# Patient Record
Sex: FEMALE | Race: WHITE | Employment: FULL TIME | URBAN - METROPOLITAN AREA
[De-identification: names, ages, dates, MRNs, and addresses within clinical notes are randomized per-mention and may not be internally consistent; named-entity substitution may affect disease eponyms.]

---

## 2017-02-23 ENCOUNTER — TELEPHONE (OUTPATIENT)
Dept: FAMILY MEDICINE CLINIC | Age: 57
End: 2017-02-23

## 2017-02-23 NOTE — TELEPHONE ENCOUNTER
Patient called and notified that she will need apt. Stated that she will talk to work and that she will call tomorrow to schedule apt. Patient advised to go to urgent care if condition worsens. Patient gave verbal understanding. Dr. Nayla De La Torre was notified of patient. Closing encounter.

## 2017-02-23 NOTE — TELEPHONE ENCOUNTER
PT CALLED STATING THAT SHE HAS BEEN EXPERIENCING DIZZY SPELLS LATELY. STATED IT HAPPENS WHEN SHE'S IN BED, WHEN SHE GETTING UP, OR EVEN JUST SITTING AT HER DESK. OFFERED PT TO COME IN FOR A VISIT BT SHE DECLINED DUE TO HER HAVING A NEW JOB AND NOT KNOWING WHAT HER HER WORK SCHEDULE IS.

## 2017-05-03 ENCOUNTER — OFFICE VISIT (OUTPATIENT)
Dept: FAMILY MEDICINE CLINIC | Age: 57
End: 2017-05-03

## 2017-05-03 ENCOUNTER — HOSPITAL ENCOUNTER (OUTPATIENT)
Dept: LAB | Age: 57
Discharge: HOME | End: 2017-05-03
Payer: COMMERCIAL

## 2017-05-03 VITALS
WEIGHT: 193 LBS | HEART RATE: 69 BPM | SYSTOLIC BLOOD PRESSURE: 147 MMHG | HEIGHT: 65 IN | RESPIRATION RATE: 18 BRPM | OXYGEN SATURATION: 97 % | DIASTOLIC BLOOD PRESSURE: 75 MMHG | TEMPERATURE: 97.7 F | BODY MASS INDEX: 32.15 KG/M2

## 2017-05-03 DIAGNOSIS — M25.559 CHRONIC HIP PAIN, UNSPECIFIED LATERALITY: ICD-10-CM

## 2017-05-03 DIAGNOSIS — I10 ESSENTIAL HYPERTENSION: ICD-10-CM

## 2017-05-03 DIAGNOSIS — G89.29 CHRONIC HIP PAIN, UNSPECIFIED LATERALITY: ICD-10-CM

## 2017-05-03 DIAGNOSIS — F32.89 OTHER DEPRESSION: ICD-10-CM

## 2017-05-03 DIAGNOSIS — I10 ESSENTIAL HYPERTENSION: Primary | ICD-10-CM

## 2017-05-03 LAB
ANION GAP BLD CALC-SCNC: 10 MMOL/L (ref 3–18)
BUN SERPL-MCNC: 20 MG/DL (ref 7–18)
BUN/CREAT SERPL: 23 (ref 12–20)
CALCIUM SERPL-MCNC: 9.1 MG/DL (ref 8.5–10.1)
CHLORIDE SERPL-SCNC: 104 MMOL/L (ref 100–108)
CO2 SERPL-SCNC: 24 MMOL/L (ref 21–32)
CREAT SERPL-MCNC: 0.88 MG/DL (ref 0.6–1.3)
GLUCOSE SERPL-MCNC: 90 MG/DL (ref 74–99)
POTASSIUM SERPL-SCNC: 3.7 MMOL/L (ref 3.5–5.5)
SODIUM SERPL-SCNC: 138 MMOL/L (ref 136–145)

## 2017-05-03 PROCEDURE — 36415 COLL VENOUS BLD VENIPUNCTURE: CPT | Performed by: FAMILY MEDICINE

## 2017-05-03 PROCEDURE — 80048 BASIC METABOLIC PNL TOTAL CA: CPT | Performed by: FAMILY MEDICINE

## 2017-05-03 RX ORDER — LISINOPRIL AND HYDROCHLOROTHIAZIDE 12.5; 2 MG/1; MG/1
1 TABLET ORAL DAILY
Qty: 90 TAB | Refills: 1 | Status: SHIPPED | OUTPATIENT
Start: 2017-05-03 | End: 2017-11-15 | Stop reason: SDUPTHER

## 2017-05-03 RX ORDER — TRAMADOL HYDROCHLORIDE 50 MG/1
50 TABLET ORAL
COMMUNITY
End: 2017-05-03 | Stop reason: SDUPTHER

## 2017-05-03 RX ORDER — CITALOPRAM 20 MG/1
20 TABLET, FILM COATED ORAL DAILY
Qty: 90 TAB | Refills: 1 | Status: SHIPPED | OUTPATIENT
Start: 2017-05-03 | End: 2018-01-04 | Stop reason: SDUPTHER

## 2017-05-03 RX ORDER — TRAMADOL HYDROCHLORIDE 50 MG/1
50 TABLET ORAL
Qty: 90 TAB | Refills: 0 | Status: SHIPPED | OUTPATIENT
Start: 2017-05-03 | End: 2019-02-26

## 2017-05-03 NOTE — PROGRESS NOTES
Maritza Eisenberg is a 61 y.o.  female and presents with F/U depression  And HTN     Chief Complaint   Patient presents with    Medication Refill     Subjective: Additional Concerns: none    Patient Active Problem List    Diagnosis Date Noted    Hip osteoarthritis 11/28/2016    Depression 11/15/2016    Conjunctivitis 11/15/2016    HTN (hypertension) 11/15/2016    Degenerative joint disease (DJD) of hip 11/15/2016    Status post right hip replacement 03/28/2016     Current Outpatient Prescriptions   Medication Sig Dispense Refill    lisinopril-hydroCHLOROthiazide (PRINZIDE, ZESTORETIC) 20-12.5 mg per tablet Take 1 Tab by mouth daily. 90 Tab 1    traMADol (ULTRAM) 50 mg tablet Take 1 Tab by mouth every six (6) hours as needed for Pain. Max Daily Amount: 200 mg. 90 Tab 0    citalopram (CELEXA) 20 mg tablet Take 1 Tab by mouth daily. Indications: ANXIETY WITH DEPRESSION 90 Tab 1    TURMERIC ROOT EXTRACT PO Take  by mouth.  Omeprazole delayed release (PRILOSEC D/R) 20 mg tablet Take 20 mg by mouth daily.  oxyCODONE-acetaminophen (PERCOCET) 5-325 mg per tablet Take 1-2 Tabs by mouth every four (4) hours as needed. Max Daily Amount: 12 Tabs. 60 Tab 0    aspirin (ASPIRIN) 325 mg tablet Take 1 Tab by mouth two (2) times a day. 30 Tab 0    genistein (I-COOL) 30 mg tab Take  by mouth daily. Allergies   Allergen Reactions    Codeine Itching     Past Medical History:   Diagnosis Date    Anxiety     Arthritis     Depression     GERD (gastroesophageal reflux disease)     Hypertension 1995    Melanoma ([address]     wrist    Postmenopausal      Past Surgical History:   Procedure Laterality Date    HX GYN  2003    uterine ablation    HX ORTHOPAEDIC Right     hip replacement    HX SKIN BIOPSY      HX TONSILLECTOMY       No family history on file.   Social History   Substance Use Topics    Smoking status: Former Smoker    Smokeless tobacco: Current User    Alcohol use 2.4 - 3.0 oz/week 4 - 5 Glasses of wine per week     ROS     General: negative for - chills, fatigue, fever, weight change  Psych: positive for - anxiety, depression, irritability or mood swings  Resp: negative for - cough, shortness of breath or wheezing  CV: negative for - chest pain, edema or palpitations  MSK: negative for - joint pain, joint swelling or muscle pain  Neuro: negative for - confusion, headaches, seizures or weakness    Objective:  Vitals:    05/03/17 1611   BP: 147/75   Pulse: 69   Resp: 18   Temp: 97.7 °F (36.5 °C)   TempSrc: Oral   SpO2: 97%   Weight: 193 lb (87.5 kg)   Height: 5' 5\" (1.651 m)   PainSc:   0 - No pain     PE    Alert, well appearing, and in no distress, oriented to person, place, and time and normal appearing weight  Mental status - alert, oriented to person, place, and time, normal mood, behavior, speech, dress, motor activity, and thought processes  Chest - clear to auscultation, no wheezes, rales or rhonchi, symmetric air entry  Heart - normal rate, regular rhythm, normal S1, S2, no murmurs, rubs, clicks or gallops  Extremities - peripheral pulses normal, no pedal edema, no clubbing or cyanosis    LABS   Admission on 11/28/2016, Discharged on 11/29/2016   Component Date Value Ref Range Status    Crossmatch Expiration 11/28/2016 12/01/2016   Final    ABO/Rh(D) 11/28/2016 A POSITIVE   Final    Antibody screen 11/28/2016 NEG   Final    Sodium 11/29/2016 139  136 - 145 mmol/L Final    Potassium 11/29/2016 3.6  3.5 - 5.5 mmol/L Final    Chloride 11/29/2016 104  100 - 108 mmol/L Final    CO2 11/29/2016 29  21 - 32 mmol/L Final    Anion gap 11/29/2016 6  3.0 - 18 mmol/L Final    Glucose 11/29/2016 144* 74 - 99 mg/dL Final    BUN 11/29/2016 12  7.0 - 18 MG/DL Final    Creatinine 11/29/2016 0.76  0.6 - 1.3 MG/DL Final    BUN/Creatinine ratio 11/29/2016 16  12 - 20   Final    GFR est AA 11/29/2016 >60  >60 ml/min/1.73m2 Final    GFR est non-AA 11/29/2016 >60  >60 ml/min/1.73m2 Final Comment: (NOTE)  Estimated GFR is calculated using the Modification of Diet in Renal   Disease (MDRD) Study equation, reported for both  Americans   (GFRAA) and non- Americans (GFRNA), and normalized to 1.73m2   body surface area. The physician must decide which value applies to   the patient. The MDRD study equation should only be used in   individuals age 25 or older. It has not been validated for the   following: pregnant women, patients with serious comorbid conditions,   or on certain medications, or persons with extremes of body size,   muscle mass, or nutritional status.       Calcium 11/29/2016 7.9* 8.5 - 10.1 MG/DL Final    WBC 11/29/2016 9.3  4.6 - 13.2 K/uL Final    RBC 11/29/2016 3.16* 4.20 - 5.30 M/uL Final    HGB 11/29/2016 9.7* 12.0 - 16.0 g/dL Final    HCT 11/29/2016 28.7* 35.0 - 45.0 % Final    MCV 11/29/2016 90.8  74.0 - 97.0 FL Final    MCH 11/29/2016 30.7  24.0 - 34.0 PG Final    MCHC 11/29/2016 33.8  31.0 - 37.0 g/dL Final    RDW 11/29/2016 13.1  11.6 - 14.5 % Final    PLATELET 61/73/5904 015  135 - 420 K/uL Final    MPV 11/29/2016 8.6* 9.2 - 11.8 FL Final    Prothrombin time 11/29/2016 12.6  11.5 - 15.2 sec Final    INR 11/29/2016 1.0  0.8 - 1.2   Final    Comment:            INR Therapeutic Ranges         (on stable oral anticoagulant):     INDICATION                INR  DVT/PE/Atrial Fib          2.0-3.0  MI/Mechanical Heart Valve  2.5-3.5     Hospital Outpatient Visit on 11/10/2016   Component Date Value Ref Range Status    Prothrombin time 11/10/2016 12.2  11.5 - 15.2 sec Final    INR 11/10/2016 0.9  0.8 - 1.2   Final    Comment:            INR Therapeutic Ranges         (on stable oral anticoagulant):     INDICATION                INR  DVT/PE/Atrial Fib          2.0-3.0  MI/Mechanical Heart Valve  2.5-3.5      aPTT 11/10/2016 25.7  23.0 - 36.4 SEC Final    WBC 11/10/2016 8.2  4.6 - 13.2 K/uL Final    RBC 11/10/2016 4.62  4.20 - 5.30 M/uL Final    HGB 11/10/2016 14.1  12.0 - 16.0 g/dL Final    HCT 11/10/2016 42.3  35.0 - 45.0 % Final    MCV 11/10/2016 91.6  74.0 - 97.0 FL Final    MCH 11/10/2016 30.5  24.0 - 34.0 PG Final    MCHC 11/10/2016 33.3  31.0 - 37.0 g/dL Final    RDW 11/10/2016 12.7  11.6 - 14.5 % Final    PLATELET 76/11/7121 910  135 - 420 K/uL Final    MPV 11/10/2016 8.9* 9.2 - 11.8 FL Final    NEUTROPHILS 11/10/2016 69  40 - 73 % Final    LYMPHOCYTES 11/10/2016 25  21 - 52 % Final    MONOCYTES 11/10/2016 5  3 - 10 % Final    EOSINOPHILS 11/10/2016 1  0 - 5 % Final    BASOPHILS 11/10/2016 0  0 - 2 % Final    ABS. NEUTROPHILS 11/10/2016 5.7  1.8 - 8.0 K/UL Final    ABS. LYMPHOCYTES 11/10/2016 2.0  0.9 - 3.6 K/UL Final    ABS. MONOCYTES 11/10/2016 0.4  0.05 - 1.2 K/UL Final    ABS. EOSINOPHILS 11/10/2016 0.1  0.0 - 0.4 K/UL Final    ABS. BASOPHILS 11/10/2016 0.0  0.0 - 0.06 K/UL Final    DF 11/10/2016 AUTOMATED    Final    Sed rate, automated 11/10/2016 13  0 - 30 mm/hr Final    Sodium 11/10/2016 138  136 - 145 mmol/L Final    Potassium 11/10/2016 3.9  3.5 - 5.5 mmol/L Final    Chloride 11/10/2016 102  100 - 108 mmol/L Final    CO2 11/10/2016 29  21 - 32 mmol/L Final    Anion gap 11/10/2016 7  3.0 - 18 mmol/L Final    Glucose 11/10/2016 86  74 - 99 mg/dL Final    BUN 11/10/2016 15  7.0 - 18 MG/DL Final    Creatinine 11/10/2016 0.72  0.6 - 1.3 MG/DL Final    BUN/Creatinine ratio 11/10/2016 21* 12 - 20   Final    GFR est AA 11/10/2016 >60  >60 ml/min/1.73m2 Final    GFR est non-AA 11/10/2016 >60  >60 ml/min/1.73m2 Final    Comment: (NOTE)  Estimated GFR is calculated using the Modification of Diet in Renal   Disease (MDRD) Study equation, reported for both  Americans   (GFRAA) and non- Americans (GFRNA), and normalized to 1.73m2   body surface area. The physician must decide which value applies to   the patient. The MDRD study equation should only be used in   individuals age 25 or older.  It has not been validated for the   following: pregnant women, patients with serious comorbid conditions,   or on certain medications, or persons with extremes of body size,   muscle mass, or nutritional status.  Calcium 11/10/2016 9.2  8.5 - 10.1 MG/DL Final    Bilirubin, total 11/10/2016 0.5  0.2 - 1.0 MG/DL Final    ALT (SGPT) 11/10/2016 21  13 - 56 U/L Final    AST (SGOT) 11/10/2016 16  15 - 37 U/L Final    Alk. phosphatase 11/10/2016 132* 45 - 117 U/L Final    Protein, total 11/10/2016 6.8  6.4 - 8.2 g/dL Final    Albumin 11/10/2016 3.9  3.4 - 5.0 g/dL Final    Globulin 11/10/2016 2.9  2.0 - 4.0 g/dL Final    A-G Ratio 11/10/2016 1.3  0.8 - 1.7   Final    Color 11/10/2016 YELLOW    Final    Appearance 11/10/2016 CLEAR    Final    Specific gravity 11/10/2016 1.020  1.003 - 1.030   Final    pH (UA) 11/10/2016 7.0  5.0 - 8.0   Final    Protein 11/10/2016 NEGATIVE   NEG mg/dL Final    Glucose 11/10/2016 NEGATIVE   NEG mg/dL Final    Ketone 11/10/2016 NEGATIVE   NEG mg/dL Final    Bilirubin 11/10/2016 NEGATIVE   NEG   Final    Blood 11/10/2016 NEGATIVE   NEG   Final    Urobilinogen 11/10/2016 0.2  0.2 - 1.0 EU/dL Final    Nitrites 11/10/2016 NEGATIVE   NEG   Final    Leukocyte Esterase 11/10/2016 NEGATIVE   NEG   Final    Special Requests: 11/10/2016 NO SPECIAL REQUESTS    Final    Culture result: 11/10/2016 MRSA target DNA is not detected (presumptive not colonized with MRSA)    Final    Hemoglobin A1c 11/10/2016 5.1  4.2 - 5.6 % Final    Comment: (NOTE)  HbA1C Interpretive Ranges  <5.7              Normal  5.7 - 6.4         Consider Prediabetes  >6.5              Consider Diabetes      Est. average glucose 11/10/2016 100  mg/dL Final    Comment: (NOTE)  The eAG should be interpreted with patient characteristics in mind   since ethnicity, interindividual differences, red cell lifespan,   variation in rates of glycation, etc. may affect the validity of the   calculation.      Hospital Outpatient Visit on 11/10/2016   Component Date Value Ref Range Status    Ventricular Rate 11/10/2016 67  BPM Final    Atrial Rate 11/10/2016 67  BPM Final    P-R Interval 11/10/2016 112  ms Final    QRS Duration 11/10/2016 90  ms Final    Q-T Interval 11/10/2016 416  ms Final    QTC Calculation (Bezet) 11/10/2016 439  ms Final    Calculated P Axis 11/10/2016 1  degrees Final    Calculated R Axis 11/10/2016 22  degrees Final    Calculated T Axis 11/10/2016 46  degrees Final    Diagnosis 11/10/2016    Final                    Value:Normal sinus rhythm  Confirmed by John Hernandez ([id number]) on 11/10/2016 6:30:30 PM         TESTS  Results for orders placed or performed during the hospital encounter of 78/22/69   METABOLIC PANEL, BASIC   Result Value Ref Range    Sodium 139 136 - 145 mmol/L    Potassium 3.6 3.5 - 5.5 mmol/L    Chloride 104 100 - 108 mmol/L    CO2 29 21 - 32 mmol/L    Anion gap 6 3.0 - 18 mmol/L    Glucose 144 (H) 74 - 99 mg/dL    BUN 12 7.0 - 18 MG/DL    Creatinine 0.76 0.6 - 1.3 MG/DL    BUN/Creatinine ratio 16 12 - 20      GFR est AA >60 >60 ml/min/1.73m2    GFR est non-AA >60 >60 ml/min/1.73m2    Calcium 7.9 (L) 8.5 - 10.1 MG/DL   CBC W/O DIFF   Result Value Ref Range    WBC 9.3 4.6 - 13.2 K/uL    RBC 3.16 (L) 4.20 - 5.30 M/uL    HGB 9.7 (L) 12.0 - 16.0 g/dL    HCT 28.7 (L) 35.0 - 45.0 %    MCV 90.8 74.0 - 97.0 FL    MCH 30.7 24.0 - 34.0 PG    MCHC 33.8 31.0 - 37.0 g/dL    RDW 13.1 11.6 - 14.5 %    PLATELET 074 178 - 431 K/uL    MPV 8.6 (L) 9.2 - 11.8 FL   PROTHROMBIN TIME + INR   Result Value Ref Range    Prothrombin time 12.6 11.5 - 15.2 sec    INR 1.0 0.8 - 1.2     TYPE & SCREEN   Result Value Ref Range    Crossmatch Expiration 12/01/2016     ABO/Rh(D) A POSITIVE     Antibody screen NEG      Assessment/Plan:     1. HTN uncontrolled - BMP ordered for monitoring. Refilled BP med and asked patient to take regularly and to monitor BP daily  Over 2-4 weeks. 2. Depression stable - Refilled Celexa x 6 months. There are no diagnoses linked to this encounter. Lab review: orders written for new lab studies as appropriate; see orders. I have discussed the diagnosis with the patient and the intended plan as seen in the above orders. The patient has received an after-visit summary and questions were answered concerning future plans. I have discussed medication side effects and warnings with the patient as well. I have reviewed the plan of care with the patient, accepted their input and they are in agreement with the treatment goals. F/U as needed. F/U in 2-4 weeks if BP above 140/90.      Chase De Leon MD

## 2017-05-03 NOTE — PROGRESS NOTES
Consuelo Gerard is a 61 y.o. female presents to office for medication refill. 1. Have you been to the ER, urgent care clinic or hospitalized since your last visit? yes  2. Have you seen any other providers outside of [facility] since your last visit? yes  3.  Have you had a Flu shot this year? no      Health Maintenance items with a due date reviewed with patient:  Health Maintenance Due   Topic Date Due    Hepatitis C Screening  1960    DTaP/Tdap/Td series (1 - Tdap) 03/08/1980    PAP AKA CERVICAL CYTOLOGY  03/08/1980    FOBT Q 1 YEAR AGE 50-75  03/08/2009

## 2017-05-03 NOTE — MR AVS SNAPSHOT
Visit Information Date & Time Provider Department Dept. Phone Encounter #  
 5/3/2017  4:15 PM Ramón Gonzalez MD [place] [phone] [id number] Upcoming Health Maintenance Date Due Hepatitis C Screening 1960 DTaP/Tdap/Td series (1 - Tdap) 3/8/1980 PAP AKA CERVICAL CYTOLOGY 3/8/1980 FOBT Q 1 YEAR AGE 50-75 3/8/2009 INFLUENZA AGE 9 TO ADULT 8/1/2017 BREAST CANCER SCRN MAMMOGRAM 10/13/2018 Allergies as of 5/3/2017  Review Complete On: 5/3/2017 By: Deloris Mercedes LPN Severity Noted Reaction Type Reactions Codeine  03/25/2016    Itching Current Immunizations  Never Reviewed No immunizations on file. Not reviewed this visit Vitals BP Pulse Temp Resp Height(growth percentile) Weight(growth percentile) 147/75 (BP 1 Location: Right arm, BP Patient Position: Sitting) 69 97.7 °F (36.5 °C) (Oral) 18 5' 5\" (1.651 m) 193 lb (87.5 kg) SpO2 BMI OB Status Smoking Status 97% 32.12 kg/m2 Postmenopausal Former Smoker BMI and BSA Data Body Mass Index Body Surface Area  
 32.12 kg/m 2 2 m 2 Preferred Pharmacy Pharmacy Name Phone [facility], [address] [address] [place] [address] [phone] Your Updated Medication List  
  
   
This list is accurate as of: 5/3/17  4:38 PM.  Always use your most recent med list.  
  
  
  
  
 aspirin 325 mg tablet Commonly known as:  ASPIRIN Take 1 Tab by mouth two (2) times a day. citalopram 20 mg tablet Commonly known as:  Isaak Heckle Take 1 Tab by mouth daily. Indications: ANXIETY WITH DEPRESSION  
  
 I-COOL 30 mg Tab Generic drug:  genistein Take  by mouth daily. lisinopril-hydroCHLOROthiazide 20-12.5 mg per tablet Commonly known as:  Esthela Collinthergill Take 1 Tab by mouth daily. Omeprazole delayed release 20 mg tablet Commonly known as:  PRILOSEC D/R Take 20 mg by mouth daily. oxyCODONE-acetaminophen 5-325 mg per tablet Commonly known as:  PERCOCET Take 1-2 Tabs by mouth every four (4) hours as needed. Max Daily Amount: 12 Tabs. traMADol 50 mg tablet Commonly known as:  ULTRAM  
Take 1 Tab by mouth every six (6) hours as needed for Pain. Max Daily Amount: 200 mg.  
  
 TURMERIC ROOT EXTRACT PO Take  by mouth. Prescriptions Printed Refills  
 traMADol (ULTRAM) 50 mg tablet 0 Sig: Take 1 Tab by mouth every six (6) hours as needed for Pain. Max Daily Amount: 200 mg. Class: Print Route: Oral  
  
Prescriptions Sent to Pharmacy Refills  
 lisinopril-hydroCHLOROthiazide (PRINZIDE, ZESTORETIC) 20-12.5 mg per tablet 1 Sig: Take 1 Tab by mouth daily. Class: Normal  
 Pharmacy: [facility] [address] Ph #: [phone] Route: Oral  
 citalopram (CELEXA) 20 mg tablet 1 Sig: Take 1 Tab by mouth daily. Indications: ANXIETY WITH DEPRESSION Class: Normal  
 Pharmacy: [facility] [address] Ph #: [phone] Route: Oral  
  
Introducing [facility]! Heath Alphonso introduces [employer] patient portal. Now you can access parts of your medical record, email your doctor's office, and request medication refills online. 1. In your internet browser, go to [email or web address] 2. Click on the First Time User? Click Here link in the Sign In box. You will see the New Member Sign Up page. 3. Enter your [employer] Access Code exactly as it appears below. You will not need to use this code after youve completed the sign-up process. If you do not sign up before the expiration date, you must request a new code. · [employer] Access Code: [email or web address] Expires: 8/1/2017  4:38 PM 
 
4. Enter the last four digits of your Social Security Number (xxxx) and Date of Birth (mm/dd/yyyy) as indicated and click Submit. You will be taken to the next sign-up page. 5. Create a [employer] ID. This will be your [employer] login ID and cannot be changed, so think of one that is secure and easy to remember. 6. Create a [employer] password. You can change your password at any time. 7. Enter your Password Reset Question and Answer. This can be used at a later time if you forget your password. 8. Enter your e-mail address. You will receive e-mail notification when new information is available in [address] 9. Click Sign Up. You can now view and download portions of your medical record. 10. Click the Download Summary menu link to download a portable copy of your medical information. If you have questions, please visit the Frequently Asked Questions section of the [employer] website. Remember, [employer] is NOT to be used for urgent needs. For medical emergencies, dial 911. Now available from your iPhone and Android! Please provide this summary of care documentation to your next provider. Your primary care clinician is listed as Rocky Fabian. If you have any questions after today's visit, please call [phone].

## 2017-05-04 ENCOUNTER — TELEPHONE (OUTPATIENT)
Dept: FAMILY MEDICINE CLINIC | Age: 57
End: 2017-05-04

## 2017-05-04 NOTE — LETTER
5/5/2017 3:33 PM 
 
Ms. Sheila Milan [address] [phone] Dear Maritza Eisenberg I have reviewed your results and have found the results listed below to be within normal ranges. Basic Metabolic Panel My recommendations are as follows: Follow up as needed Please call if you have any questions [phone] . Sincerely, Jean Paul Ceballos MD

## 2017-05-06 NOTE — PATIENT INSTRUCTIONS
DASH Diet: Care Instructions  Your Care Instructions  The DASH diet is an eating plan that can help lower your blood pressure. DASH stands for Dietary Approaches to Stop Hypertension. Hypertension is high blood pressure. The DASH diet focuses on eating foods that are high in calcium, potassium, and magnesium. These nutrients can lower blood pressure. The foods that are highest in these nutrients are fruits, vegetables, low-fat dairy products, nuts, seeds, and legumes. But taking calcium, potassium, and magnesium supplements instead of eating foods that are high in those nutrients does not have the same effect. The DASH diet also includes whole grains, fish, and poultry. The DASH diet is one of several lifestyle changes your doctor may recommend to lower your high blood pressure. Your doctor may also want you to decrease the amount of sodium in your diet. Lowering sodium while following the DASH diet can lower blood pressure even further than just the DASH diet alone. Follow-up care is a key part of your treatment and safety. Be sure to make and go to all appointments, and call your doctor if you are having problems. It's also a good idea to know your test results and keep a list of the medicines you take. How can you care for yourself at home? Following the DASH diet  · Eat 4 to 5 servings of fruit each day. A serving is 1 medium-sized piece of fruit, ½ cup chopped or canned fruit, 1/4 cup dried fruit, or 4 ounces (½ cup) of fruit juice. Choose fruit more often than fruit juice. · Eat 4 to 5 servings of vegetables each day. A serving is 1 cup of lettuce or raw leafy vegetables, ½ cup of chopped or cooked vegetables, or 4 ounces (½ cup) of vegetable juice. Choose vegetables more often than vegetable juice. · Get 2 to 3 servings of low-fat and fat-free dairy each day. A serving is 8 ounces of milk, 1 cup of yogurt, or 1 ½ ounces of cheese. · Eat 6 to 8 servings of grains each day.  A serving is 1 slice of bread, 1 ounce of dry cereal, or ½ cup of cooked rice, pasta, or cooked cereal. Try to choose whole-grain products as much as possible. · Limit lean meat, poultry, and fish to 2 servings each day. A serving is 3 ounces, about the size of a deck of cards. · Eat 4 to 5 servings of nuts, seeds, and legumes (cooked dried beans, lentils, and split peas) each week. A serving is 1/3 cup of nuts, 2 tablespoons of seeds, or ½ cup of cooked beans or peas. · Limit fats and oils to 2 to 3 servings each day. A serving is 1 teaspoon of vegetable oil or 2 tablespoons of salad dressing. · Limit sweets and added sugars to 5 servings or less a week. A serving is 1 tablespoon jelly or jam, ½ cup sorbet, or 1 cup of lemonade. · Eat less than 2,300 milligrams (mg) of sodium a day. If you limit your sodium to 1,500 mg a day, you can lower your blood pressure even more. Tips for success  · Start small. Do not try to make dramatic changes to your diet all at once. You might feel that you are missing out on your favorite foods and then be more likely to not follow the plan. Make small changes, and stick with them. Once those changes become habit, add a few more changes. · Try some of the following:  ¨ Make it a goal to eat a fruit or vegetable at every meal and at snacks. This will make it easy to get the recommended amount of fruits and vegetables each day. ¨ Try yogurt topped with fruit and nuts for a snack or healthy dessert. ¨ Add lettuce, tomato, cucumber, and onion to sandwiches. ¨ Combine a ready-made pizza crust with low-fat mozzarella cheese and lots of vegetable toppings. Try using tomatoes, squash, spinach, broccoli, carrots, cauliflower, and onions. ¨ Have a variety of cut-up vegetables with a low-fat dip as an appetizer instead of chips and dip. ¨ Sprinkle sunflower seeds or chopped almonds over salads. Or try adding chopped walnuts or almonds to cooked vegetables. ¨ Try some vegetarian meals using beans and peas. Add garbanzo or kidney beans to salads. Make burritos and tacos with mashed pimentel beans or black beans. Where can you learn more? Go to http://peter-ana.info/. Enter J922 in the search box to learn more about \"DASH Diet: Care Instructions. \"  Current as of: March 23, 2016  Content Version: 11.2  © 1815-9137 [employer]. Care instructions adapted under license by [employer] (which disclaims liability or warranty for this information). If you have questions about a medical condition or this instruction, always ask your healthcare professional. [place] [address] any warranty or liability for your use of this information. Low Sodium Diet (2,000 Milligram): Care Instructions  Your Care Instructions  Too much sodium causes your body to hold on to extra water. This can raise your blood pressure and force your heart and kidneys to work harder. In very serious cases, this could cause you to be put in the hospital. It might even be life-threatening. By limiting sodium, you will feel better and lower your risk of serious problems. The most common source of sodium is salt. People get most of the salt in their diet from canned, prepared, and packaged foods. Fast food and restaurant meals also are very high in sodium. Your doctor will probably limit your sodium to less than 2,000 milligrams (mg) a day. This limit counts all the sodium in prepared and packaged foods and any salt you add to your food. Follow-up care is a key part of your treatment and safety. Be sure to make and go to all appointments, and call your doctor if you are having problems. It's also a good idea to know your test results and keep a list of the medicines you take. How can you care for yourself at home? Read food labels  · Read labels on cans and food packages. The labels tell you how much sodium is in each serving. Make sure that you look at the serving size.  If you eat more than the serving size, you have eaten more sodium. · Food labels also tell you the Percent Daily Value for sodium. Choose products with low Percent Daily Values for sodium. · Be aware that sodium can come in forms other than salt, including monosodium glutamate (MSG), sodium citrate, and sodium bicarbonate (baking soda). MSG is often added to Asian food. When you eat out, you can sometimes ask for food without MSG or added salt. Buy low-sodium foods  · Buy foods that are labeled \"unsalted\" (no salt added), \"sodium-free\" (less than 5 mg of sodium per serving), or \"low-sodium\" (less than 140 mg of sodium per serving). Foods labeled \"reduced-sodium\" and \"light sodium\" may still have too much sodium. Be sure to read the label to see how much sodium you are getting. · Buy fresh vegetables, or frozen vegetables without added sauces. Buy low-sodium versions of canned vegetables, soups, and other canned goods. Prepare low-sodium meals  · Cut back on the amount of salt you use in cooking. This will help you adjust to the taste. Do not add salt after cooking. One teaspoon of salt has about 2,300 mg of sodium. · Take the salt shaker off the table. · Flavor your food with garlic, lemon juice, onion, vinegar, herbs, and spices. Do not use soy sauce, lite soy sauce, steak sauce, onion salt, garlic salt, celery salt, mustard, or ketchup on your food. · Use low-sodium salad dressings, sauces, and ketchup. Or make your own salad dressings and sauces without adding salt. · Use less salt (or none) when recipes call for it. You can often use half the salt a recipe calls for without losing flavor. Other foods such as rice, pasta, and grains do not need added salt. · Rinse canned vegetables, and cook them in fresh water. This removes somebut not allof the salt. · Avoid water that is naturally high in sodium or that has been treated with water softeners, which add sodium.  Call your local water company to find out the sodium content of your water supply. If you buy bottled water, read the label and choose a sodium-free brand. Avoid high-sodium foods  · Avoid eating:  ¨ Smoked, cured, salted, and canned meat, fish, and poultry. ¨ Ham, puente, hot dogs, and luncheon meats. ¨ Regular, hard, and processed cheese and regular peanut butter. ¨ Crackers with salted tops, and other salted snack foods such as pretzels, chips, and salted popcorn. ¨ Frozen prepared meals, unless labeled low-sodium. ¨ Canned and dried soups, broths, and bouillon, unless labeled sodium-free or low-sodium. ¨ Canned vegetables, unless labeled sodium-free or low-sodium. ¨ [place] fries, pizza, tacos, and other fast foods. ¨ Pickles, olives, ketchup, and other condiments, especially soy sauce, unless labeled sodium-free or low-sodium. Where can you learn more? Go to http://peter-ana.info/. Enter D488 in the search box to learn more about \"Low Sodium Diet (2,000 Milligram): Care Instructions. \"  Current as of: July 26, 2016  Content Version: 11.2  © 7873-1873 [employer]. Care instructions adapted under license by [employer] (which disclaims liability or warranty for this information). If you have questions about a medical condition or this instruction, always ask your healthcare professional. [place] [address] any warranty or liability for your use of this information.

## 2017-07-19 ENCOUNTER — PATIENT OUTREACH (OUTPATIENT)
Dept: FAMILY MEDICINE CLINIC | Age: 57
End: 2017-07-19

## 2017-07-20 ENCOUNTER — PATIENT OUTREACH (OUTPATIENT)
Dept: FAMILY MEDICINE CLINIC | Age: 57
End: 2017-07-20

## 2017-11-15 DIAGNOSIS — I10 ESSENTIAL HYPERTENSION: ICD-10-CM

## 2017-11-15 RX ORDER — LISINOPRIL AND HYDROCHLOROTHIAZIDE 12.5; 2 MG/1; MG/1
TABLET ORAL
Qty: 90 TAB | Refills: 0 | Status: SHIPPED | OUTPATIENT
Start: 2017-11-15 | End: 2018-02-19 | Stop reason: SDUPTHER

## 2018-01-04 DIAGNOSIS — F32.89 OTHER DEPRESSION: ICD-10-CM

## 2018-01-09 RX ORDER — CITALOPRAM 20 MG/1
TABLET, FILM COATED ORAL
Qty: 90 TAB | Refills: 0 | Status: SHIPPED | OUTPATIENT
Start: 2018-01-09 | End: 2019-02-26

## 2018-02-19 DIAGNOSIS — I10 ESSENTIAL HYPERTENSION: ICD-10-CM

## 2018-02-19 NOTE — TELEPHONE ENCOUNTER
Pt calling to request medication refill of:    Requested Prescriptions     Pending Prescriptions Disp Refills    lisinopril-hydroCHLOROthiazide (PRINZIDE, ZESTORETIC) 20-12.5 mg per tablet 90 Tab 0     Sig: Take 1 Tab by mouth daily. be sent to [facility] in [place]. Pt has about 3 tabs remaining. Pts last appt was 05/03/17, next appt sched for 02/28/18. Advised pt of 72 hour time frame for refill requests. Please advise.

## 2018-02-19 NOTE — TELEPHONE ENCOUNTER
Dr. Janie Phan, please see refill request for patient, next apt is schedule for 02/28/18. thank you! Requested Prescriptions     Pending Prescriptions Disp Refills    lisinopril-hydroCHLOROthiazide (PRINZIDE, ZESTORETIC) 20-12.5 mg per tablet 90 Tab 0     Sig: Take 1 Tab by mouth daily.

## 2018-02-20 RX ORDER — LISINOPRIL AND HYDROCHLOROTHIAZIDE 12.5; 2 MG/1; MG/1
1 TABLET ORAL DAILY
Qty: 90 TAB | Refills: 0 | Status: SHIPPED | OUTPATIENT
Start: 2018-02-20 | End: 2018-05-04 | Stop reason: SDUPTHER

## 2018-05-04 DIAGNOSIS — I10 ESSENTIAL HYPERTENSION: ICD-10-CM

## 2018-05-22 RX ORDER — LISINOPRIL AND HYDROCHLOROTHIAZIDE 12.5; 2 MG/1; MG/1
TABLET ORAL
Qty: 30 TAB | Refills: 0 | Status: SHIPPED | OUTPATIENT
Start: 2018-05-22 | End: 2019-02-26

## 2018-12-18 ENCOUNTER — PATIENT OUTREACH (OUTPATIENT)
Dept: FAMILY MEDICINE CLINIC | Age: 58
End: 2018-12-18

## 2019-02-26 ENCOUNTER — OFFICE VISIT (OUTPATIENT)
Dept: FAMILY MEDICINE CLINIC | Age: 59
End: 2019-02-26

## 2019-02-26 VITALS
TEMPERATURE: 97.9 F | HEIGHT: 65 IN | SYSTOLIC BLOOD PRESSURE: 164 MMHG | BODY MASS INDEX: 31.49 KG/M2 | OXYGEN SATURATION: 97 % | HEART RATE: 76 BPM | DIASTOLIC BLOOD PRESSURE: 94 MMHG | RESPIRATION RATE: 18 BRPM | WEIGHT: 189 LBS

## 2019-02-26 DIAGNOSIS — E78.2 MIXED HYPERLIPIDEMIA: ICD-10-CM

## 2019-02-26 DIAGNOSIS — I10 ESSENTIAL HYPERTENSION: ICD-10-CM

## 2019-02-26 DIAGNOSIS — L82.1 SEBORRHEIC KERATOSIS: ICD-10-CM

## 2019-02-26 DIAGNOSIS — F33.2 SEVERE EPISODE OF RECURRENT MAJOR DEPRESSIVE DISORDER, WITHOUT PSYCHOTIC FEATURES (HCC): ICD-10-CM

## 2019-02-26 DIAGNOSIS — E66.09 CLASS 1 OBESITY DUE TO EXCESS CALORIES WITH BODY MASS INDEX (BMI) OF 31.0 TO 31.9 IN ADULT, UNSPECIFIED WHETHER SERIOUS COMORBIDITY PRESENT: Primary | ICD-10-CM

## 2019-02-26 RX ORDER — THERA TABS 400 MCG
1 TAB ORAL DAILY
COMMUNITY

## 2019-02-26 RX ORDER — NALTREXONE HYDROCHLORIDE 50 MG/1
50 TABLET, FILM COATED ORAL DAILY
COMMUNITY

## 2019-02-26 RX ORDER — LANOLIN ALCOHOL/MO/W.PET/CERES
CREAM (GRAM) TOPICAL DAILY
COMMUNITY

## 2019-02-26 RX ORDER — HYDROCHLOROTHIAZIDE 12.5 MG/1
12.5 TABLET ORAL DAILY
COMMUNITY

## 2019-02-26 RX ORDER — FOLIC ACID 1 MG/1
TABLET ORAL DAILY
COMMUNITY

## 2019-02-26 RX ORDER — LISINOPRIL 20 MG/1
TABLET ORAL DAILY
COMMUNITY

## 2019-02-26 RX ORDER — NICOTINE 7MG/24HR
1 PATCH, TRANSDERMAL 24 HOURS TRANSDERMAL EVERY 24 HOURS
COMMUNITY

## 2019-02-26 RX ORDER — FLUOXETINE HYDROCHLORIDE 40 MG/1
CAPSULE ORAL DAILY
COMMUNITY

## 2019-02-26 RX ORDER — OLANZAPINE 5 MG/1
TABLET ORAL
COMMUNITY
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
## 2019-02-26 NOTE — LETTER
NOTIFICATION RETURN TO WORK 
 
2/26/2019 12:02 PM 
 
Ms. Sheila [place] [address] [address] To Whom It May Concern: 
 
Solis Adams is currently under the care of [address] [place] [address]. She will return to work: in 60 days subjective to reassessment. If there are questions or concerns please have the patient contact our office. Sincerely, Roderick Boyle MD

## 2019-02-26 NOTE — PROGRESS NOTES
Crow Ross is a 60 y.o. female presents to office for establish care and htn and depression. Skin problem Medication list has been reviewed with Crow Ross and updated as of today's date Health Maintenance items with a due date reviewed with patient: 
Health Maintenance Due Topic Date Due  
 Hepatitis C Screening  1960  
 DTaP/Tdap/Td series (1 - Tdap) 03/08/1980  PAP AKA CERVICAL CYTOLOGY  03/08/1980  Shingrix Vaccine Age 50> (1 of 2) 03/08/2009  FOBT Q 1 YEAR AGE 50-75  03/08/2009  BREAST CANCER SCRN MAMMOGRAM  10/13/2018

## 2024-08-21 ENCOUNTER — NEW PATIENT (OUTPATIENT)
Dept: RURAL CLINIC 1 | Facility: CLINIC | Age: 64
End: 2024-08-21

## 2024-08-21 DIAGNOSIS — Z96.1: ICD-10-CM

## 2024-08-21 DIAGNOSIS — Z98.890: ICD-10-CM

## 2024-08-21 DIAGNOSIS — H16.223: ICD-10-CM

## 2024-08-21 PROCEDURE — 92004 COMPRE OPH EXAM NEW PT 1/>: CPT

## 2024-08-21 ASSESSMENT — VISUAL ACUITY
OD_CC: 20/20
OS_SC: 20/25-2
OD_SC: 20/80-1
OU_SC: 20/20-3
OS_CC: 20/20
OU_CC: 20/20

## 2024-08-21 ASSESSMENT — TONOMETRY
OS_IOP_MMHG: 16
OD_IOP_MMHG: 16

## 2025-01-15 ENCOUNTER — COMPREHENSIVE EXAM (OUTPATIENT)
Age: 65
End: 2025-01-15

## 2025-01-15 DIAGNOSIS — H16.223: ICD-10-CM

## 2025-01-15 DIAGNOSIS — Z96.1: ICD-10-CM

## 2025-01-15 DIAGNOSIS — Z98.890: ICD-10-CM

## 2025-01-15 PROCEDURE — 92012 INTRM OPH EXAM EST PATIENT: CPT

## 2025-08-28 ENCOUNTER — COMPREHENSIVE EXAM (OUTPATIENT)
Age: 65
End: 2025-08-28

## 2025-08-28 DIAGNOSIS — Z96.1: ICD-10-CM

## 2025-08-28 DIAGNOSIS — H26.493: ICD-10-CM

## 2025-08-28 DIAGNOSIS — H16.223: ICD-10-CM

## 2025-08-28 DIAGNOSIS — Z98.890: ICD-10-CM

## 2025-08-28 PROCEDURE — 92014 COMPRE OPH EXAM EST PT 1/>: CPT
